# Patient Record
Sex: FEMALE | Race: BLACK OR AFRICAN AMERICAN | NOT HISPANIC OR LATINO
[De-identification: names, ages, dates, MRNs, and addresses within clinical notes are randomized per-mention and may not be internally consistent; named-entity substitution may affect disease eponyms.]

---

## 2017-03-05 ENCOUNTER — RESULT REVIEW (OUTPATIENT)
Age: 62
End: 2017-03-05

## 2017-07-05 ENCOUNTER — RESULT REVIEW (OUTPATIENT)
Age: 62
End: 2017-07-05

## 2018-07-28 ENCOUNTER — TRANSCRIPTION ENCOUNTER (OUTPATIENT)
Age: 63
End: 2018-07-28

## 2018-08-06 ENCOUNTER — TRANSCRIPTION ENCOUNTER (OUTPATIENT)
Age: 63
End: 2018-08-06

## 2020-05-27 ENCOUNTER — HISTORICAL (OUTPATIENT)
Dept: ADMINISTRATIVE | Facility: HOSPITAL | Age: 65
End: 2020-05-27

## 2020-05-27 LAB
ALBUMIN SERPL BCP-MCNC: 3.9 G/DL (ref 3.5–5)
ALBUMIN/GLOB SERPL: 0.9 {RATIO}
ALP SERPL-CCNC: 84 U/L (ref 50–130)
ALT SERPL W P-5'-P-CCNC: 21 U/L (ref 13–56)
AST SERPL W P-5'-P-CCNC: 20 U/L (ref 15–37)
BASOPHILS # BLD AUTO: 0.03 X10E3/UL (ref 0–0.2)
BASOPHILS NFR BLD AUTO: 0.4 % (ref 0–1)
BILIRUB SERPL-MCNC: 0.8 MG/DL (ref 0–1.2)
BILIRUB UR QL STRIP: NEGATIVE MG/DL
BUN SERPL-MCNC: 13 MG/DL (ref 7–18)
BUN/CREAT SERPL: 16
CALCIUM SERPL-MCNC: 9.6 MG/DL (ref 8.5–10.1)
CHLORIDE SERPL-SCNC: 101 MMOL/L (ref 98–107)
CHOLEST SERPL-MCNC: 191 MG/DL
CHOLEST/HDLC SERPL: 2.9 {RATIO}
CLARITY UR: CLEAR
CO2 SERPL-SCNC: 33 MMOL/L (ref 21–32)
COLOR UR: YELLOW
CREAT SERPL-MCNC: 0.81 MG/DL (ref 0.55–1.02)
CREAT UR-MCNC: 69 MG/DL (ref 28–217)
EOSINOPHIL # BLD AUTO: 0.3 X10E3/UL (ref 0–0.5)
EOSINOPHIL NFR BLD AUTO: 3.6 % (ref 1–4)
ERYTHROCYTE [DISTWIDTH] IN BLOOD BY AUTOMATED COUNT: 14.9 % (ref 11.5–14.5)
EST. AVERAGE GLUCOSE BLD GHB EST-MCNC: 117 MG/DL
GLOBULIN SER-MCNC: 4.3 G/DL (ref 2–4)
GLUCOSE SERPL-MCNC: 107 MG/DL (ref 74–106)
GLUCOSE UR STRIP-MCNC: NORMAL MG/DL
HBA1C MFR BLD HPLC: 6.1 %
HCT VFR BLD AUTO: 38.6 % (ref 38–47)
HDLC SERPL-MCNC: 65 MG/DL
HGB BLD-MCNC: 12.2 G/DL (ref 12–16)
IMM GRANULOCYTES # BLD AUTO: 0.01 X10E3/UL (ref 0–0.04)
IMM GRANULOCYTES NFR BLD: 0.1 % (ref 0–0.4)
KETONES UR STRIP-SCNC: NEGATIVE MG/DL
LDLC SERPL CALC-MCNC: 108 MG/DL
LEUKOCYTE ESTERASE UR QL STRIP: NEGATIVE LEU/UL
LYMPHOCYTES # BLD AUTO: 3.06 X10E3/UL (ref 1–4.8)
LYMPHOCYTES NFR BLD AUTO: 36.4 % (ref 27–41)
MCH RBC QN AUTO: 26.5 PG (ref 27–31)
MCHC RBC AUTO-ENTMCNC: 31.6 G/DL (ref 32–36)
MCV RBC AUTO: 83.9 FL (ref 80–96)
MICROALBUMIN UR-MCNC: 0.6 MG/DL (ref 0–2.8)
MICROALBUMIN/CREAT RATIO PNL UR: <30 MG/G (ref 0–30)
MONOCYTES # BLD AUTO: 0.59 X10E3/UL (ref 0–0.8)
MONOCYTES NFR BLD AUTO: 7 % (ref 2–6)
MPC BLD CALC-MCNC: 9.7 FL (ref 9.4–12.4)
NEUTROPHILS # BLD AUTO: 4.41 X10E3/UL (ref 1.8–7.7)
NEUTROPHILS NFR BLD AUTO: 52.5 % (ref 53–65)
NITRITE UR QL STRIP: NEGATIVE
PH UR STRIP: 7 PH UNITS (ref 5–8)
PLATELET # BLD AUTO: 508 X10E3/UL (ref 150–400)
POTASSIUM SERPL-SCNC: 3.6 MMOL/L (ref 3.5–5.1)
PROT SERPL-MCNC: 8.2 G/DL (ref 6.4–8.2)
PROT UR QL STRIP: NEGATIVE MG/DL
RBC # BLD AUTO: 4.6 X10E6/UL (ref 4.2–5.4)
RBC # UR STRIP: NEGATIVE ERY/UL
SODIUM SERPL-SCNC: 141 MMOL/L (ref 136–145)
SP GR UR STRIP: 1.01 (ref 1–1.03)
TRIGL SERPL-MCNC: 88 MG/DL
TSH SERPL DL<=0.005 MIU/L-ACNC: 0.46 UIU/ML (ref 0.36–3.74)
UROBILINOGEN UR STRIP-ACNC: 0.2 MG/DL
WBC # BLD AUTO: 8.4 X10E3/UL (ref 4.5–11)

## 2023-01-17 ENCOUNTER — HOSPITAL ENCOUNTER (EMERGENCY)
Facility: HOSPITAL | Age: 68
Discharge: HOME | End: 2023-01-17
Payer: COMMERCIAL

## 2023-01-17 VITALS
DIASTOLIC BLOOD PRESSURE: 77 MMHG | WEIGHT: 268.19 LBS | HEIGHT: 68 IN | OXYGEN SATURATION: 98 % | HEART RATE: 68 BPM | RESPIRATION RATE: 19 BRPM | TEMPERATURE: 98 F | BODY MASS INDEX: 40.65 KG/M2 | SYSTOLIC BLOOD PRESSURE: 117 MMHG

## 2023-01-17 DIAGNOSIS — M54.50 ACUTE BILATERAL LOW BACK PAIN WITHOUT SCIATICA: Primary | ICD-10-CM

## 2023-01-17 LAB — GLUCOSE SERPL-MCNC: 93 MG/DL (ref 70–105)

## 2023-01-17 PROCEDURE — 63600175 PHARM REV CODE 636 W HCPCS: Performed by: EMERGENCY MEDICINE

## 2023-01-17 PROCEDURE — 99284 EMERGENCY DEPT VISIT MOD MDM: CPT

## 2023-01-17 PROCEDURE — 99283 EMERGENCY DEPT VISIT LOW MDM: CPT | Mod: ,,, | Performed by: EMERGENCY MEDICINE

## 2023-01-17 PROCEDURE — 99283 PR EMERGENCY DEPT VISIT,LEVEL III: ICD-10-PCS | Mod: ,,, | Performed by: EMERGENCY MEDICINE

## 2023-01-17 PROCEDURE — 96372 THER/PROPH/DIAG INJ SC/IM: CPT | Performed by: EMERGENCY MEDICINE

## 2023-01-17 PROCEDURE — 82962 GLUCOSE BLOOD TEST: CPT

## 2023-01-17 RX ORDER — LISINOPRIL AND HYDROCHLOROTHIAZIDE 20; 25 MG/1; MG/1
TABLET ORAL
COMMUNITY

## 2023-01-17 RX ORDER — GLUCOSAM/CHONDRO/HERB 149/HYAL 750-100 MG
1000 TABLET ORAL DAILY
COMMUNITY

## 2023-01-17 RX ORDER — PREDNISONE 10 MG/1
10 TABLET ORAL DAILY
Qty: 21 TABLET | Refills: 0 | Status: SHIPPED | OUTPATIENT
Start: 2023-01-17 | End: 2023-01-17 | Stop reason: SDUPTHER

## 2023-01-17 RX ORDER — LEVOTHYROXINE SODIUM 100 UG/1
100 TABLET ORAL
COMMUNITY
Start: 2022-12-20

## 2023-01-17 RX ORDER — ORPHENADRINE CITRATE 30 MG/ML
60 INJECTION INTRAMUSCULAR; INTRAVENOUS
Status: COMPLETED | OUTPATIENT
Start: 2023-01-17 | End: 2023-01-17

## 2023-01-17 RX ORDER — GABAPENTIN 100 MG/1
100-200 CAPSULE ORAL NIGHTLY
COMMUNITY
Start: 2022-12-29

## 2023-01-17 RX ORDER — DEXAMETHASONE SODIUM PHOSPHATE 4 MG/ML
8 INJECTION, SOLUTION INTRA-ARTICULAR; INTRALESIONAL; INTRAMUSCULAR; INTRAVENOUS; SOFT TISSUE
Status: COMPLETED | OUTPATIENT
Start: 2023-01-17 | End: 2023-01-17

## 2023-01-17 RX ORDER — PREDNISONE 10 MG/1
10 TABLET ORAL DAILY
Qty: 21 TABLET | Refills: 0 | Status: SHIPPED | OUTPATIENT
Start: 2023-01-17

## 2023-01-17 RX ORDER — METFORMIN HYDROCHLORIDE 500 MG/1
500 TABLET, EXTENDED RELEASE ORAL 2 TIMES DAILY
COMMUNITY
Start: 2022-08-08

## 2023-01-17 RX ORDER — AMLODIPINE BESYLATE 5 MG/1
5 TABLET ORAL
COMMUNITY
Start: 2022-12-22

## 2023-01-17 RX ORDER — ASPIRIN 81 MG/1
TABLET ORAL
COMMUNITY

## 2023-01-17 RX ADMIN — DEXAMETHASONE SODIUM PHOSPHATE 8 MG: 4 INJECTION, SOLUTION INTRA-ARTICULAR; INTRALESIONAL; INTRAMUSCULAR; INTRAVENOUS; SOFT TISSUE at 10:01

## 2023-01-17 RX ADMIN — ORPHENADRINE CITRATE 60 MG: 30 INJECTION INTRAMUSCULAR; INTRAVENOUS at 10:01

## 2023-01-17 NOTE — ED PROVIDER NOTES
Encounter Date: 1/17/2023       History     Chief Complaint   Patient presents with    Back Pain     Patient presents with low back pain that started 2 days ago after a prolonged train ride from [place] to here to visit with her family here.  Denies injury, no loss of bowel or bladder control, no lower extremity weakness.  She states it is difficult for her to try to stand up after sitting for a while, but that her pain feels a little better after moving around a bit.    Review of patient's allergies indicates:  No Known Allergies  Past Medical History:   Diagnosis Date    Essential (primary) hypertension     Hypercholesteremia     Hypothyroidism, unspecified     Type 2 diabetes mellitus without complications      History reviewed. No pertinent surgical history.  History reviewed. No pertinent family history.  Social History     Tobacco Use    Smoking status: Never    Smokeless tobacco: Never   Substance Use Topics    Alcohol use: Never    Drug use: Never     Review of Systems   Constitutional: Negative.    HENT: Negative.     Eyes: Negative.    Respiratory: Negative.     Cardiovascular: Negative.    Gastrointestinal: Negative.    Genitourinary: Negative.    Musculoskeletal:  Positive for back pain. Negative for arthralgias, gait problem, joint swelling, myalgias, neck pain and neck stiffness.   Skin: Negative.    Neurological: Negative.    Hematological: Negative.    Psychiatric/Behavioral: Negative.     All other systems reviewed and are negative.    Physical Exam     Initial Vitals [01/17/23 0938]   BP Pulse Resp Temp SpO2   117/77 68 19 98.4 °F (36.9 °C) 98 %      MAP       --         Physical Exam    Nursing note and vitals reviewed.  Constitutional: She appears well-developed and well-nourished.   HENT:   Head: Normocephalic.   Right Ear: External ear normal.   Left Ear: External ear normal.   Eyes: Conjunctivae and EOM are normal. Pupils are equal, round, and reactive to light.   Neck: Neck supple. No JVD  present.   Normal range of motion.  Cardiovascular:  Normal rate, regular rhythm, normal heart sounds and intact distal pulses.           No murmur heard.  Pulmonary/Chest: Breath sounds normal. No stridor. No respiratory distress. She has no wheezes. She has no rhonchi. She has no rales.   Abdominal: Abdomen is soft. Bowel sounds are normal. She exhibits no distension. There is no abdominal tenderness.   Musculoskeletal:         General: Tenderness (patient has bilateral lumbar paraspinal muscle tenderness and spasm, no bony midline tenderness.) present. No edema. Normal range of motion.      Cervical back: Normal range of motion and neck supple.     Lymphadenopathy:     She has no cervical adenopathy.   Neurological: She is alert and oriented to person, place, and time. She has normal strength. No cranial nerve deficit.   Skin: Skin is warm and dry. Capillary refill takes less than 2 seconds. No rash noted. No erythema. No pallor.   Psychiatric: She has a normal mood and affect. Her behavior is normal.       Medical Screening Exam   See Full Note    ED Course   Procedures  Labs Reviewed   POCT GLUCOSE MONITORING CONTINUOUS          Imaging Results    None          Medications   dexAMETHasone injection 8 mg (8 mg Intramuscular Given 1/17/23 1030)   orphenadrine injection 60 mg (60 mg Intramuscular Given 1/17/23 1030)     Medical Decision Making:   ED Management:  Patient was treated with IM Decadron and prescribed a tapering dose of prednisone for her musculoskeletal back pain.                 Clinical Impression:   Final diagnoses:  [M54.50] Acute bilateral low back pain without sciatica (Primary)        ED Disposition Condition    Discharge Stable          ED Prescriptions       Medication Sig Dispense Start Date End Date Auth. Provider    predniSONE (DELTASONE) 10 MG tablet Take 1 tablet (10 mg total) by mouth once daily. Take 4 tabs x 3 days, then take 2 tabs x 3 days, then take 1 tab x 3 days. 21 tablet  1/17/2023 -- Ciro Kuhn DO          Follow-up Information       Follow up With Specialties Details Why Contact Info    Mary Orta MD Family Medicine Schedule an appointment as soon as possible for a visit in 2 days To recheck; sooner if worse, not improving, or if any new symptoms. [id number] [place] [address]  [phone]               Ciro Kuhn DO  01/17/23 1049

## 2023-01-17 NOTE — ED TRIAGE NOTES
Pt ambulatory to er with c/o lower back pain since Sunday- pt denies injury- pt nat jones from [place] to visit son- pt states she is originally from [place]- pt states he has been taking tylenol with no relief- last dose 2100 last pm

## 2023-01-23 NOTE — ADDENDUM NOTE
Encounter addended by: Rosetta Quevedo on: 1/23/2023 11:35 AM   Actions taken: SmartForm saved, Flowsheet accepted